# Patient Record
Sex: FEMALE | Race: WHITE | NOT HISPANIC OR LATINO | URBAN - METROPOLITAN AREA
[De-identification: names, ages, dates, MRNs, and addresses within clinical notes are randomized per-mention and may not be internally consistent; named-entity substitution may affect disease eponyms.]

---

## 2019-12-12 ENCOUNTER — TELEPHONE (OUTPATIENT)
Dept: FAMILY MEDICINE | Facility: CLINIC | Age: 54
End: 2019-12-12

## 2019-12-12 ENCOUNTER — DOCUMENTATION ONLY (OUTPATIENT)
Dept: FAMILY MEDICINE | Facility: CLINIC | Age: 54
End: 2019-12-12

## 2019-12-12 NOTE — TELEPHONE ENCOUNTER
----- Message from Yvonne Han sent at 12/12/2019 12:20 PM CST -----  Contact: Self  Pt is calling to speak with Staff regarding being scheduled for an appt; however,  is unable to find availability.    She can be reached at [phone].    Thank you.

## 2019-12-12 NOTE — PROGRESS NOTES
Pre-Visit Chart Review  For Appointment Scheduled on 12/13/19    There are no preventive care reminders to display for this patient.

## 2019-12-13 ENCOUNTER — OFFICE VISIT (OUTPATIENT)
Dept: FAMILY MEDICINE | Facility: CLINIC | Age: 54
End: 2019-12-13
Payer: COMMERCIAL

## 2019-12-13 VITALS
BODY MASS INDEX: 35.61 KG/M2 | HEIGHT: 66 IN | WEIGHT: 221.56 LBS | TEMPERATURE: 98 F | OXYGEN SATURATION: 95 % | HEART RATE: 99 BPM | RESPIRATION RATE: 12 BRPM | DIASTOLIC BLOOD PRESSURE: 80 MMHG | SYSTOLIC BLOOD PRESSURE: 136 MMHG

## 2019-12-13 DIAGNOSIS — J01.00 ACUTE NON-RECURRENT MAXILLARY SINUSITIS: ICD-10-CM

## 2019-12-13 DIAGNOSIS — Z00.00 HEALTHCARE MAINTENANCE: Primary | ICD-10-CM

## 2019-12-13 PROCEDURE — 99999 PR PBB SHADOW E&M-EST. PATIENT-LVL III: CPT | Mod: PBBFAC,,, | Performed by: FAMILY MEDICINE

## 2019-12-13 PROCEDURE — 96372 THER/PROPH/DIAG INJ SC/IM: CPT | Mod: S$GLB,,, | Performed by: FAMILY MEDICINE

## 2019-12-13 PROCEDURE — 99386 PR PREVENTIVE VISIT,NEW,40-64: ICD-10-PCS | Mod: 25,S$GLB,, | Performed by: FAMILY MEDICINE

## 2019-12-13 PROCEDURE — 99386 PREV VISIT NEW AGE 40-64: CPT | Mod: 25,S$GLB,, | Performed by: FAMILY MEDICINE

## 2019-12-13 PROCEDURE — 99999 PR PBB SHADOW E&M-EST. PATIENT-LVL III: ICD-10-PCS | Mod: PBBFAC,,, | Performed by: FAMILY MEDICINE

## 2019-12-13 PROCEDURE — 96372 PR INJECTION,THERAP/PROPH/DIAG2ST, IM OR SUBCUT: ICD-10-PCS | Mod: S$GLB,,, | Performed by: FAMILY MEDICINE

## 2019-12-13 RX ORDER — BETAMETHASONE SODIUM PHOSPHATE AND BETAMETHASONE ACETATE 3; 3 MG/ML; MG/ML
12 INJECTION, SUSPENSION INTRA-ARTICULAR; INTRALESIONAL; INTRAMUSCULAR; SOFT TISSUE
Status: COMPLETED | OUTPATIENT
Start: 2019-12-13 | End: 2019-12-13

## 2019-12-13 RX ORDER — TESTOSTERONE GEL, 1% 10 MG/G
GEL TRANSDERMAL DAILY
COMMUNITY

## 2019-12-13 RX ORDER — AMOXICILLIN 875 MG/1
875 TABLET, FILM COATED ORAL EVERY 12 HOURS
Qty: 20 TABLET | Refills: 0 | Status: SHIPPED | OUTPATIENT
Start: 2019-12-13 | End: 2019-12-23

## 2019-12-13 RX ORDER — ESTRADIOL 1 MG/1
TABLET ORAL
Refills: 0 | COMMUNITY
Start: 2019-12-01

## 2019-12-13 RX ADMIN — BETAMETHASONE SODIUM PHOSPHATE AND BETAMETHASONE ACETATE 12 MG: 3; 3 INJECTION, SUSPENSION INTRA-ARTICULAR; INTRALESIONAL; INTRAMUSCULAR; SOFT TISSUE at 09:12

## 2019-12-13 NOTE — PROGRESS NOTES
Subjective:   Patient ID: China Medina is a 54 y.o. female     Chief Complaint:Establish Care and Sinus Problem      Patient for checkup and establish care.  Patient also with rhinorrhea, congestion, coughing going on for the past 2 weeks.  Nothing makes it better.  Nothing makes it worse.  Symptoms are moderate severity.  Patient denies any recorded fever.    Review of Systems   Constitutional: Negative for chills and fever.   HENT: Negative for sore throat.    Eyes: Negative for visual disturbance.   Respiratory: Negative for shortness of breath.    Cardiovascular: Negative for chest pain.   Gastrointestinal: Negative for abdominal pain.   Endocrine: Negative for polyuria.   Genitourinary: Negative for dysuria.   Musculoskeletal: Negative for back pain.   Skin: Negative for color change.   Neurological: Negative for headaches.   Psychiatric/Behavioral: Negative for agitation and confusion.     History reviewed. No pertinent past medical history.  Objective:     Vitals:    12/13/19 0910   BP: 136/80   Pulse: 99   Resp: 12   Temp: 98.4 °F (36.9 °C)     Body mass index is 36.31 kg/m².  Physical Exam   Constitutional: No distress.   HENT:   Head: Normocephalic and atraumatic.   Eyes: EOM are normal.   Cardiovascular: Normal rate and normal heart sounds.   Pulmonary/Chest: Effort normal.   Abdominal: Bowel sounds are normal.   Musculoskeletal: Normal range of motion.   Neurological: She is alert.   Psychiatric: She has a normal mood and affect.     Assessment:     1. Healthcare maintenance    2. Acute non-recurrent maxillary sinusitis      Plan:   Healthcare maintenance  Patient recent blood work done at Dr. Alberto office.  Will request blood work.  Patient also has mammogram and Pap smear done at this office.  Will request these records as well.  Patient states she has never had a colonoscopy done before.  Counseled patient on importance of having 1 done in as a way to reduce cancer risk.  Patient is  concerned about insurance coverage.  Discussed in length with patient these are typically covered by insurance companies though they may specify where she has them done.  Patient will follow to have this done in the future.    Acute non-recurrent maxillary sinusitis  -     betamethasone acetate-betamethasone sodium phosphate injection 12 mg  -     amoxicillin (AMOXIL) 875 MG tablet; Take 1 tablet (875 mg total) by mouth every 12 (twelve) hours. for 10 days  Dispense: 20 tablet; Refill: 0        Noel Frey MD  12/13/2019    Portions of this note have been dictated with ARABELLA Shrestha.

## 2019-12-16 ENCOUNTER — PATIENT OUTREACH (OUTPATIENT)
Dept: ADMINISTRATIVE | Facility: HOSPITAL | Age: 54
End: 2019-12-16

## 2019-12-16 NOTE — LETTER
AUTHORIZATION FOR RELEASE OF   CONFIDENTIAL INFORMATION    [facility]    We are seeing China Medina, date of birth 1965, in the clinic at [facility]. Noel Frey MD is the patient's PCP. China Medina has an outstanding lab/procedure at the time we reviewed her chart. In order to help keep her health information updated, she has authorized us to request the following medical record(s):        (X  )  MAMMOGRAM                                      (  )  COLONOSCOPY      (  )  PAP SMEAR                                          (  )  OUTSIDE LAB RESULTS     (  )  DEXA SCAN                                          (  )  EYE EXAM            (  )  FOOT EXAM                                          (  )  ENTIRE RECORD     (  )  OUTSIDE IMMUNIZATIONS                 (  )  _______________         Please fax records to RadhasNoel teran MD, [phone]     Thank you in advance,      Chelly MACIAS  Panel Care Coordinator  [facility] [facility] [address] [place] [address]  Phone [phone]  Fax [phone]          Patient Name: China Medina  : 1965  Patient Phone #: [phone]

## 2019-12-27 DIAGNOSIS — Z12.11 COLON CANCER SCREENING: ICD-10-CM

## 2019-12-27 DIAGNOSIS — Z12.39 BREAST CANCER SCREENING: ICD-10-CM

## 2020-01-14 ENCOUNTER — TELEPHONE (OUTPATIENT)
Dept: FAMILY MEDICINE | Facility: CLINIC | Age: 55
End: 2020-01-14

## 2020-01-14 NOTE — TELEPHONE ENCOUNTER
Pt c/o congestion and sinus pressure. Pt stated she's been having this issue since Thanksgiving. Advised she needs to be seen for evaluation. Scheduled pt to see MELANI Hu on 1/15/20 at 1040am. Understanding verbalized.

## 2020-01-14 NOTE — TELEPHONE ENCOUNTER
----- Message from Charissa WILFRED Hardy sent at 1/14/2020  8:48 AM CST -----  Contact: Patient  Type: Needs Medical Advice    Who Called: Patient  Symptoms (please be specific):  Congestion, headaches, ear ache  How long has patient had these symptoms: 3 days  Pharmacy name and phone #:    [facility] #[id number] [address] [address]  Phone: [phone] Fax: [phone]  Best Call Back Number:   Additional Information: Requesting a call back in regards to patient states she took all her antibiotics and she is not feeling any better. Please call to advise.

## 2020-01-14 NOTE — TELEPHONE ENCOUNTER
----- Message from Mark Kaplan sent at 1/14/2020 10:08 AM CST -----  Type:  Patient Returning Call    Who Called:  Patient  Who Left Message for Patient:  Reyna  Does the patient know what this is regarding?: Sinus issues    Best Call Back Number:  [phone]  Additional Information:

## 2020-01-15 ENCOUNTER — OFFICE VISIT (OUTPATIENT)
Dept: FAMILY MEDICINE | Facility: CLINIC | Age: 55
End: 2020-01-15
Payer: COMMERCIAL

## 2020-01-15 ENCOUNTER — DOCUMENTATION ONLY (OUTPATIENT)
Dept: FAMILY MEDICINE | Facility: CLINIC | Age: 55
End: 2020-01-15

## 2020-01-15 VITALS
SYSTOLIC BLOOD PRESSURE: 118 MMHG | WEIGHT: 226 LBS | HEART RATE: 88 BPM | TEMPERATURE: 99 F | HEIGHT: 65 IN | DIASTOLIC BLOOD PRESSURE: 76 MMHG | BODY MASS INDEX: 37.65 KG/M2 | OXYGEN SATURATION: 97 %

## 2020-01-15 DIAGNOSIS — J32.9 RHINOSINUSITIS: ICD-10-CM

## 2020-01-15 DIAGNOSIS — J06.9 RECURRENT URI (UPPER RESPIRATORY INFECTION): Primary | ICD-10-CM

## 2020-01-15 PROCEDURE — 99999 PR PBB SHADOW E&M-EST. PATIENT-LVL III: ICD-10-PCS | Mod: PBBFAC,,, | Performed by: PHYSICIAN ASSISTANT

## 2020-01-15 PROCEDURE — 3008F PR BODY MASS INDEX (BMI) DOCUMENTED: ICD-10-PCS | Mod: CPTII,S$GLB,, | Performed by: PHYSICIAN ASSISTANT

## 2020-01-15 PROCEDURE — 99999 PR PBB SHADOW E&M-EST. PATIENT-LVL III: CPT | Mod: PBBFAC,,, | Performed by: PHYSICIAN ASSISTANT

## 2020-01-15 PROCEDURE — 3008F BODY MASS INDEX DOCD: CPT | Mod: CPTII,S$GLB,, | Performed by: PHYSICIAN ASSISTANT

## 2020-01-15 PROCEDURE — 99213 PR OFFICE/OUTPT VISIT, EST, LEVL III, 20-29 MIN: ICD-10-PCS | Mod: S$GLB,,, | Performed by: PHYSICIAN ASSISTANT

## 2020-01-15 PROCEDURE — 99213 OFFICE O/P EST LOW 20 MIN: CPT | Mod: S$GLB,,, | Performed by: PHYSICIAN ASSISTANT

## 2020-01-15 RX ORDER — AZELASTINE 1 MG/ML
1 SPRAY, METERED NASAL 2 TIMES DAILY
Qty: 30 ML | Refills: 0 | Status: SHIPPED | OUTPATIENT
Start: 2020-01-15 | End: 2021-01-14

## 2020-01-15 RX ORDER — METHYLPREDNISOLONE 4 MG/1
TABLET ORAL
Qty: 1 PACKAGE | Refills: 0 | Status: SHIPPED | OUTPATIENT
Start: 2020-01-15 | End: 2020-02-05

## 2020-01-15 NOTE — PROGRESS NOTES
Pre-Visit Chart Review  For Appointment Scheduled on (1/15/20)    Health Maintenance Due   Topic Date Due    Lipid Panel  1965    TETANUS VACCINE  02/06/1984    Mammogram  02/06/2006    Colonoscopy  02/06/2016

## 2020-01-15 NOTE — PROGRESS NOTES
Subjective:       Patient ID: China Medina is a 54 y.o. female.    Chief Complaint: Sinus Problem (Nasal Congestion, Headaches, Runny Nose (2 days)) and Back Pain (Lower (today))    Patient is new to me.  Patient presents for evaluation of sinus congestion and pressure.  She states her symptoms began within the past 1 week.  Patient states that she has been sick off and on for the past 3 months.  She has been treated with several courses of antibiotics.  She had an episode of allergic reaction to Chinese food and was treated with Benadryl and antihistamines.  She states that she developed facial swelling with swelling of the lips.  Her symptoms responded to antihistamines.  Patients patient medical/surgical, social and family histories have been reviewed       Review of Systems   Constitutional: Negative for activity change, appetite change, chills, diaphoresis, fatigue and fever.   HENT: Positive for congestion, ear pain, facial swelling, rhinorrhea, sinus pressure, sinus pain and sneezing. Negative for postnasal drip and sore throat.    Respiratory: Negative for cough, chest tightness and wheezing.    Gastrointestinal: Negative for abdominal pain, diarrhea, nausea and vomiting.   Musculoskeletal: Positive for arthralgias and back pain.   Neurological: Negative for dizziness and headaches.       Objective:      Physical Exam   Constitutional: She appears well-developed and well-nourished. No distress.   HENT:   Head: Normocephalic and atraumatic.   Right Ear: Tympanic membrane, external ear and ear canal normal.   Left Ear: Tympanic membrane, external ear and ear canal normal.   Nose: Mucosal edema present. No rhinorrhea. Right sinus exhibits maxillary sinus tenderness and frontal sinus tenderness. Left sinus exhibits maxillary sinus tenderness and frontal sinus tenderness.   Mouth/Throat: Oropharynx is clear and moist. Mucous membranes are not dry. No oropharyngeal exudate, posterior oropharyngeal edema or  posterior oropharyngeal erythema.   Cardiovascular: Normal rate and regular rhythm.   No murmur heard.  Pulmonary/Chest: Effort normal and breath sounds normal. She has no wheezes. She has no rales.   Lymphadenopathy:        Head (right side): No tonsillar adenopathy present.        Head (left side): No tonsillar adenopathy present.     She has no cervical adenopathy.   Skin: Skin is warm and dry.   Nursing note and vitals reviewed.      Assessment:       1. Recurrent URI (upper respiratory infection)    2. Rhinosinusitis        Plan:       China was seen today for sinus problem and back pain.    Diagnoses and all orders for this visit:    Recurrent URI (upper respiratory infection)  -     X-Ray Sinuses Min 3 Views; Future    Rhinosinusitis  -     X-Ray Sinuses Min 3 Views; Future    Other orders  -     methylPREDNISolone (MEDROL DOSEPACK) 4 mg tablet; use as directed  -     azelastine (ASTELIN) 137 mcg (0.1 %) nasal spray; 1 spray (137 mcg total) by Nasal route 2 (two) times daily.    Consider referral to allergy/immunology if symptoms persist        Follow up for xray today , pcp or me in 1 mo .

## 2020-02-11 ENCOUNTER — DOCUMENTATION ONLY (OUTPATIENT)
Dept: FAMILY MEDICINE | Facility: CLINIC | Age: 55
End: 2020-02-11

## 2020-02-11 NOTE — PROGRESS NOTES
Pre-Visit Chart Review  For Appointment Scheduled on (2/11/20)    Health Maintenance Due   Topic Date Due    Lipid Panel  1965    TETANUS VACCINE  02/06/1984    Mammogram  02/06/2006    Colonoscopy  02/06/2016

## 2020-10-05 ENCOUNTER — PATIENT MESSAGE (OUTPATIENT)
Dept: ADMINISTRATIVE | Facility: HOSPITAL | Age: 55
End: 2020-10-05

## 2020-10-15 ENCOUNTER — PATIENT OUTREACH (OUTPATIENT)
Dept: ADMINISTRATIVE | Facility: HOSPITAL | Age: 55
End: 2020-10-15

## 2020-10-15 NOTE — LETTER
AUTHORIZATION FOR RELEASE OF   CONFIDENTIAL INFORMATION    Dear [facility],    We are seeing China Medina, date of birth 1965, in the clinic at [facility]. Noel Frey MD is the patient's PCP. China Medina has an outstanding lab/procedure at the time we reviewed her chart. In order to help keep her health information updated, she has authorized us to request the following medical record(s):        ( X )  MAMMOGRAM                                      (  )  COLONOSCOPY      (  )  PAP SMEAR                                          (  )  OUTSIDE LAB RESULTS     (  )  DEXA SCAN                                          (  )  EYE EXAM            (  )  FOOT EXAM                                          (  )  ENTIRE RECORD     (  )  OUTSIDE IMMUNIZATIONS                 (  )  _______________         Please fax records to [facility], Joseph Oschwald, MD, [phone]    Thank you in advance,  Gabriela Vazquez LPN, Clinical Care Coordinator  [address] [address]  P: [phone]  F: [phone]              Patient Name: China Medina  : 1965  Patient Phone #: [phone]

## 2020-10-16 ENCOUNTER — PATIENT OUTREACH (OUTPATIENT)
Dept: ADMINISTRATIVE | Facility: HOSPITAL | Age: 55
End: 2020-10-16

## 2021-01-04 ENCOUNTER — PATIENT MESSAGE (OUTPATIENT)
Dept: ADMINISTRATIVE | Facility: HOSPITAL | Age: 56
End: 2021-01-04

## 2021-01-07 DIAGNOSIS — Z12.11 COLON CANCER SCREENING: ICD-10-CM

## 2021-03-10 DIAGNOSIS — Z12.31 OTHER SCREENING MAMMOGRAM: ICD-10-CM

## 2021-04-05 ENCOUNTER — PATIENT MESSAGE (OUTPATIENT)
Dept: ADMINISTRATIVE | Facility: HOSPITAL | Age: 56
End: 2021-04-05

## 2021-05-06 ENCOUNTER — PATIENT MESSAGE (OUTPATIENT)
Dept: RESEARCH | Facility: HOSPITAL | Age: 56
End: 2021-05-06

## 2021-07-06 ENCOUNTER — PATIENT MESSAGE (OUTPATIENT)
Dept: ADMINISTRATIVE | Facility: HOSPITAL | Age: 56
End: 2021-07-06

## 2021-07-07 ENCOUNTER — PATIENT MESSAGE (OUTPATIENT)
Dept: ADMINISTRATIVE | Facility: HOSPITAL | Age: 56
End: 2021-07-07

## 2021-10-07 ENCOUNTER — PATIENT MESSAGE (OUTPATIENT)
Dept: ADMINISTRATIVE | Facility: HOSPITAL | Age: 56
End: 2021-10-07

## 2021-11-19 ENCOUNTER — PATIENT OUTREACH (OUTPATIENT)
Dept: ADMINISTRATIVE | Facility: HOSPITAL | Age: 56
End: 2021-11-19
Payer: COMMERCIAL

## 2022-04-05 NOTE — TELEPHONE ENCOUNTER
"GYN Progress Note    The visit was completed with the patient and her  with the patient's  acting as an , she declined a  at this visit.     CC: F/U secondary infertility/recurrent pregnancy loss     HPI: Checo Hong is a 40 YO  who presents for colposcopy but would also like to further discuss concerns regarding secondary infertility and recurrent pregnancy loss. She had a reascent miscarriage in 10/2021 and has reported regular menstrual periods since that time but has not become pregnant again. At her last visit we reviewed etiologies for secondary infertility and recurrent pregnancy loss and that diminished ovarian reserve/decreased egg quality were likely a factor. She and her partner would like to further discuss her recent lab results and next steps moving forward.     O:   Vital signs:  Temp: 97.7  F (36.5  C)   BP: 112/70 Pulse: 54     SpO2: 100 %       Weight: 61.1 kg (134 lb 12.8 oz)  Estimated body mass index is 22.09 kg/m  as calculated from the following:    Height as of 22: 1.664 m (5' 5.5\").    Weight as of this encounter: 61.1 kg (134 lb 12.8 oz).     Component      Latest Ref Rng & Units 3/25/2022   FSH      IU/L 7.4   Anti-Mullerian Hormone      <=6.282 ng/mL 2.589   Estradiol      pg/mL 76     Component      Latest Ref Rng & Units 3/25/2022   INR      0.85 - 1.15 0.99   Thrombin Time      13.0 - 19.0 Seconds 17.0   PTT Ratio      <1.21 0.87   DRVVT Screen Ratio      <1.21 1.03   Lupus Result      Negative Negative   Lupus Interpretation       The INR is normal. . . .   Beta 2 Glycoprotein 1 Antibody IgG      <7.0 U/mL 2.0   Beta 2 Glycoprotein 1 Antibody IgM      <7.0 U/mL 13.0 (H)     Component      Latest Ref Rng & Units 3/25/2022   FSH      IU/L 7.4   Anti-Mullerian Hormone      <=6.282 ng/mL 2.589   Estradiol      pg/mL 76   Assessment and plan:   Checo Hong is a 40 YO  who presents to discuss concerns regarding secondary " LM to call back.      infertility and recurrent pregnancy loss     (N96) History of recurrent miscarriages  -We had previously discussed that early miscarriage is unfortunately common and that we typically don't recommend an evaluation until after 2-3 consecutive pregnancy losses. We also discussed myrna the risk of miscarriage does increase with maternal age which is likely a contributing factor.The patient and her partner elected to proceed with further testing and a HSG was recommended to further assess the uterine cavity as well as tubal patency but the patient has not yet scheduled. We also discussed karyotype testing which they declined.  -They expressed concern about antibodies affecting the pregnancy ? Antiphopholipid antibodies?   -Antiphospholipid antibody testing was obtained and patient had a weak positive anticardiolipin antibody and beta-2 glycoprotein but does not meet clinical criteria for antiphospolipid antibody syndrome so I would not recommend anticoagulation with further pregnancy although ASA would be indicated for pre-e prophylaxis.   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   -I had also recommend a HSG to assess tubal patency and a semen analysis for her partner if they would like to complete the evaluation for secondary fertility.   -We again discussed that if they would like to do everything possible to conceive, I would recommend evaluation/treament by a SEDA specialist and information was provided to schedule this appointment if they desire.They seemed hesitent to proceed with any ART at this time, we reviewed the option to complete the testing listed above or to forgo further testing and for them  to continue to try on their own. Following this discussion, they would like to proceed with further testing including a HSG and semen analysis.     Dispo: patient to call with the first day of her next period to schedule HSG  Time spent: a total of 15 minutes was spent face to face with the patient and her  in discussion related to infertility and recurrent pregnancy loss separate from the colposcopy procedure.     Shannon Aggarwal MD

## 2022-10-11 NOTE — PROGRESS NOTES
ID patient by name and date of birth.  Allergies confirmed.  Celestone 12 mg  given as per orders , using aseptic technique.  Patient tolerated well.    Surgical Defect Length In Cm (Optional): 0.9

## 2022-10-26 ENCOUNTER — OCCUPATIONAL HEALTH (OUTPATIENT)
Dept: URGENT CARE | Facility: CLINIC | Age: 57
End: 2022-10-26

## 2022-10-26 PROCEDURE — 80305 DRUG TEST PRSMV DIR OPT OBS: CPT | Mod: S$GLB,,, | Performed by: EMERGENCY MEDICINE

## 2022-10-26 PROCEDURE — 80305 PR DRUG SCREEN - 1: ICD-10-PCS | Mod: S$GLB,,, | Performed by: EMERGENCY MEDICINE
